# Patient Record
Sex: FEMALE | Race: ASIAN | NOT HISPANIC OR LATINO | URBAN - METROPOLITAN AREA
[De-identification: names, ages, dates, MRNs, and addresses within clinical notes are randomized per-mention and may not be internally consistent; named-entity substitution may affect disease eponyms.]

---

## 2018-01-10 ENCOUNTER — OFFICE VISIT - HEALTHEAST (OUTPATIENT)
Dept: FAMILY MEDICINE | Facility: CLINIC | Age: 28
End: 2018-01-10

## 2018-01-10 DIAGNOSIS — R17 ELEVATED BILIRUBIN: ICD-10-CM

## 2018-01-10 DIAGNOSIS — L29.9 PRURITIC CONDITION: ICD-10-CM

## 2018-01-10 DIAGNOSIS — L30.9 DERMATITIS: ICD-10-CM

## 2018-01-10 LAB
ALBUMIN SERPL-MCNC: 3.8 G/DL (ref 3.5–5)
ALP SERPL-CCNC: 68 U/L (ref 45–120)
ALT SERPL W P-5'-P-CCNC: 14 U/L (ref 0–45)
ANION GAP SERPL CALCULATED.3IONS-SCNC: 11 MMOL/L (ref 5–18)
AST SERPL W P-5'-P-CCNC: 15 U/L (ref 0–40)
BILIRUB SERPL-MCNC: 1.5 MG/DL (ref 0–1)
BUN SERPL-MCNC: 19 MG/DL (ref 8–22)
CALCIUM SERPL-MCNC: 9.4 MG/DL (ref 8.5–10.5)
CHLORIDE BLD-SCNC: 105 MMOL/L (ref 98–107)
CO2 SERPL-SCNC: 25 MMOL/L (ref 22–31)
CREAT SERPL-MCNC: 0.69 MG/DL (ref 0.6–1.1)
ERYTHROCYTE [DISTWIDTH] IN BLOOD BY AUTOMATED COUNT: 12.8 % (ref 11–14.5)
GFR SERPL CREATININE-BSD FRML MDRD: >60 ML/MIN/1.73M2
GLUCOSE BLD-MCNC: 68 MG/DL (ref 70–125)
HCT VFR BLD AUTO: 40.7 % (ref 35–47)
HGB BLD-MCNC: 13.6 G/DL (ref 12–16)
MCH RBC QN AUTO: 28 PG (ref 27–34)
MCHC RBC AUTO-ENTMCNC: 33.4 G/DL (ref 32–36)
MCV RBC AUTO: 84 FL (ref 80–100)
PLATELET # BLD AUTO: 252 THOU/UL (ref 140–440)
PMV BLD AUTO: 6.9 FL (ref 7–10)
POTASSIUM BLD-SCNC: 4.2 MMOL/L (ref 3.5–5)
PROT SERPL-MCNC: 7.2 G/DL (ref 6–8)
RBC # BLD AUTO: 4.85 MILL/UL (ref 3.8–5.4)
SODIUM SERPL-SCNC: 141 MMOL/L (ref 136–145)
TSH SERPL DL<=0.005 MIU/L-ACNC: 1.02 UIU/ML (ref 0.3–5)
WBC: 7.5 THOU/UL (ref 4–11)

## 2018-01-10 RX ORDER — TRIAMCINOLONE ACETONIDE 1 MG/G
CREAM TOPICAL
Qty: 30 G | Refills: 0 | Status: SHIPPED | OUTPATIENT
Start: 2018-01-10

## 2018-01-10 RX ORDER — HYDROXYZINE HYDROCHLORIDE 25 MG/1
25 TABLET, FILM COATED ORAL 3 TIMES DAILY PRN
Qty: 60 TABLET | Refills: 0 | Status: SHIPPED | OUTPATIENT
Start: 2018-01-10

## 2018-01-10 ASSESSMENT — MIFFLIN-ST. JEOR: SCORE: 1217.91

## 2018-01-11 LAB
BILIRUB DIRECT SERPL-MCNC: 0.4 MG/DL
HAPTOGLOB SERPL-MCNC: 155 MG/DL (ref 33–171)
RETICS # AUTO: 0.07 MILL/UL (ref 0.01–0.11)

## 2018-01-12 LAB
BASOPHILS # BLD AUTO: 0 THOU/UL (ref 0–0.2)
BASOPHILS NFR BLD AUTO: 1 % (ref 0–2)
EOSINOPHIL # BLD AUTO: 0.2 THOU/UL (ref 0–0.4)
EOSINOPHIL NFR BLD AUTO: 3 % (ref 0–6)
ERYTHROCYTE [DISTWIDTH] IN BLOOD BY AUTOMATED COUNT: 14.2 % (ref 11–14.5)
HCT VFR BLD AUTO: 41.4 % (ref 35–47)
HGB BLD-MCNC: 13.7 G/DL (ref 12–16)
LAB AP CHARGES (HE HISTORICAL CONVERSION): NORMAL
LYMPHOCYTES # BLD AUTO: 1.8 THOU/UL (ref 0.8–4.4)
LYMPHOCYTES NFR BLD AUTO: 22 % (ref 20–40)
MCH RBC QN AUTO: 27.8 PG (ref 27–34)
MCHC RBC AUTO-ENTMCNC: 33.1 G/DL (ref 32–36)
MCV RBC AUTO: 84 FL (ref 80–100)
MONOCYTES # BLD AUTO: 0.7 THOU/UL (ref 0–0.9)
MONOCYTES NFR BLD AUTO: 9 % (ref 2–10)
NEUTROPHILS # BLD AUTO: 5.4 THOU/UL (ref 2–7.7)
NEUTROPHILS NFR BLD AUTO: 66 % (ref 50–70)
OVALOCYTES: ABNORMAL
PATH REPORT.COMMENTS IMP SPEC: NORMAL
PATH REPORT.COMMENTS IMP SPEC: NORMAL
PATH REPORT.FINAL DX SPEC: NORMAL
PATH REPORT.MICROSCOPIC SPEC OTHER STN: ABNORMAL
PATH REPORT.MICROSCOPIC SPEC OTHER STN: NORMAL
PATH REPORT.RELEVANT HX SPEC: NORMAL
PLAT MORPH BLD: NORMAL
PLATELET # BLD AUTO: 292 THOU/UL (ref 140–440)
PMV BLD AUTO: 10.4 FL (ref 8.5–12.5)
RBC # BLD AUTO: 4.92 MILL/UL (ref 3.8–5.4)
WBC: 8.2 THOU/UL (ref 4–11)

## 2018-01-16 ENCOUNTER — COMMUNICATION - HEALTHEAST (OUTPATIENT)
Dept: FAMILY MEDICINE | Facility: CLINIC | Age: 28
End: 2018-01-16

## 2018-01-18 ENCOUNTER — RECORDS - HEALTHEAST (OUTPATIENT)
Dept: ADMINISTRATIVE | Facility: OTHER | Age: 28
End: 2018-01-18

## 2018-01-19 ENCOUNTER — RECORDS - HEALTHEAST (OUTPATIENT)
Dept: ADMINISTRATIVE | Facility: OTHER | Age: 28
End: 2018-01-19

## 2018-01-22 ENCOUNTER — RECORDS - HEALTHEAST (OUTPATIENT)
Dept: ADMINISTRATIVE | Facility: OTHER | Age: 28
End: 2018-01-22

## 2018-01-25 ENCOUNTER — HOSPITAL ENCOUNTER (OUTPATIENT)
Dept: RADIOLOGY | Facility: HOSPITAL | Age: 28
Discharge: HOME | End: 2018-01-25
Attending: OBSTETRICS & GYNECOLOGY | Admitting: RADIOLOGY

## 2018-01-25 ENCOUNTER — RECORDS - HEALTHEAST (OUTPATIENT)
Dept: ADMINISTRATIVE | Facility: OTHER | Age: 28
End: 2018-01-25

## 2018-01-25 DIAGNOSIS — Z87.59 HISTORY OF ECTOPIC PREGNANCY: ICD-10-CM

## 2018-02-01 ENCOUNTER — COMMUNICATION - HEALTHEAST (OUTPATIENT)
Dept: FAMILY MEDICINE | Facility: CLINIC | Age: 28
End: 2018-02-01

## 2018-02-06 ENCOUNTER — AMBULATORY - HEALTHEAST (OUTPATIENT)
Dept: FAMILY MEDICINE | Facility: CLINIC | Age: 28
End: 2018-02-06

## 2018-02-06 ENCOUNTER — COMMUNICATION - HEALTHEAST (OUTPATIENT)
Dept: TELEHEALTH | Facility: CLINIC | Age: 28
End: 2018-02-06

## 2018-02-06 ENCOUNTER — AMBULATORY - HEALTHEAST (OUTPATIENT)
Dept: LAB | Facility: CLINIC | Age: 28
End: 2018-02-06

## 2018-02-06 DIAGNOSIS — R17 SERUM TOTAL BILIRUBIN ELEVATED: ICD-10-CM

## 2018-02-06 LAB
ALBUMIN SERPL-MCNC: 3.9 G/DL (ref 3.5–5)
ALP SERPL-CCNC: 63 U/L (ref 45–120)
ALT SERPL W P-5'-P-CCNC: <9 U/L (ref 0–45)
ANION GAP SERPL CALCULATED.3IONS-SCNC: 9 MMOL/L (ref 5–18)
AST SERPL W P-5'-P-CCNC: 15 U/L (ref 0–40)
BASOPHILS # BLD AUTO: 0 THOU/UL (ref 0–0.2)
BASOPHILS NFR BLD AUTO: 1 % (ref 0–2)
BILIRUB DIRECT SERPL-MCNC: 0.3 MG/DL
BILIRUB SERPL-MCNC: 1.1 MG/DL (ref 0–1)
BUN SERPL-MCNC: 14 MG/DL (ref 8–22)
CALCIUM SERPL-MCNC: 9.2 MG/DL (ref 8.5–10.5)
CHLORIDE BLD-SCNC: 106 MMOL/L (ref 98–107)
CO2 SERPL-SCNC: 24 MMOL/L (ref 22–31)
CREAT SERPL-MCNC: 0.66 MG/DL (ref 0.6–1.1)
EOSINOPHIL # BLD AUTO: 0.2 THOU/UL (ref 0–0.4)
EOSINOPHIL NFR BLD AUTO: 4 % (ref 0–6)
ERYTHROCYTE [DISTWIDTH] IN BLOOD BY AUTOMATED COUNT: 13 % (ref 11–14.5)
GFR SERPL CREATININE-BSD FRML MDRD: >60 ML/MIN/1.73M2
GLUCOSE BLD-MCNC: 82 MG/DL (ref 70–125)
HCT VFR BLD AUTO: 40.2 % (ref 35–47)
HGB BLD-MCNC: 13.2 G/DL (ref 12–16)
INR PPP: 1.13 (ref 0.9–1.1)
LYMPHOCYTES # BLD AUTO: 1.6 THOU/UL (ref 0.8–4.4)
LYMPHOCYTES NFR BLD AUTO: 26 % (ref 20–40)
MCH RBC QN AUTO: 27.5 PG (ref 27–34)
MCHC RBC AUTO-ENTMCNC: 32.8 G/DL (ref 32–36)
MCV RBC AUTO: 84 FL (ref 80–100)
MONOCYTES # BLD AUTO: 0.4 THOU/UL (ref 0–0.9)
MONOCYTES NFR BLD AUTO: 7 % (ref 2–10)
NEUTROPHILS # BLD AUTO: 3.8 THOU/UL (ref 2–7.7)
NEUTROPHILS NFR BLD AUTO: 63 % (ref 50–70)
PLATELET # BLD AUTO: 236 THOU/UL (ref 140–440)
PMV BLD AUTO: 7.3 FL (ref 7–10)
POTASSIUM BLD-SCNC: 3.7 MMOL/L (ref 3.5–5)
PROT SERPL-MCNC: 7.4 G/DL (ref 6–8)
RBC # BLD AUTO: 4.79 MILL/UL (ref 3.8–5.4)
SODIUM SERPL-SCNC: 139 MMOL/L (ref 136–145)
WBC: 6.1 THOU/UL (ref 4–11)

## 2018-02-07 ENCOUNTER — COMMUNICATION - HEALTHEAST (OUTPATIENT)
Dept: FAMILY MEDICINE | Facility: CLINIC | Age: 28
End: 2018-02-07

## 2018-02-07 ENCOUNTER — OFFICE VISIT - HEALTHEAST (OUTPATIENT)
Dept: FAMILY MEDICINE | Facility: CLINIC | Age: 28
End: 2018-02-07

## 2018-02-07 DIAGNOSIS — L29.9 PRURITIC CONDITION: ICD-10-CM

## 2018-02-07 DIAGNOSIS — R17 ELEVATED BILIRUBIN: ICD-10-CM

## 2018-02-07 RX ORDER — PERMETHRIN 50 MG/G
CREAM TOPICAL
Qty: 60 G | Refills: 1 | Status: SHIPPED | OUTPATIENT
Start: 2018-02-07

## 2018-02-08 ENCOUNTER — COMMUNICATION - HEALTHEAST (OUTPATIENT)
Dept: FAMILY MEDICINE | Facility: CLINIC | Age: 28
End: 2018-02-08

## 2018-02-08 LAB
ERYTHROCYTE [SEDIMENTATION RATE] IN BLOOD BY WESTERGREN METHOD: 7 MM/HR (ref 0–20)
HBV SURFACE AG SERPL QL IA: NEGATIVE
HIV 1+2 AB+HIV1 P24 AG SERPL QL IA: NEGATIVE

## 2018-02-09 ENCOUNTER — COMMUNICATION - HEALTHEAST (OUTPATIENT)
Dept: FAMILY MEDICINE | Facility: CLINIC | Age: 28
End: 2018-02-09

## 2018-02-09 LAB
BILE AC SERPL-SCNC: 3 UMOL/L (ref 0–10)
HBV SURFACE AB SERPL IA-ACNC: POSITIVE M[IU]/ML
HCV AB SERPL QL IA: NEGATIVE

## 2021-05-31 ENCOUNTER — RECORDS - HEALTHEAST (OUTPATIENT)
Dept: ADMINISTRATIVE | Facility: OTHER | Age: 31
End: 2021-05-31

## 2021-05-31 VITALS — WEIGHT: 117 LBS | BODY MASS INDEX: 21.23 KG/M2

## 2021-05-31 VITALS — HEIGHT: 62 IN | WEIGHT: 119.2 LBS | BODY MASS INDEX: 21.94 KG/M2

## 2021-06-15 NOTE — PROGRESS NOTES
[facility] clinic EXAM note      Chief Complaint   Patient presents with     Allergic Reaction     started bumps on groin, spread all over body- treated at ER for over 1 months 10 days- went [facility]       Assessment & Plan    Problem List Items Addressed This Visit     None      Visit Diagnoses     Dermatitis    -  Primary: Refill of her triamcinolone and Atarax.  To help with symptomatic support we work pruritus up further.    Relevant Medications    triamcinolone (KENALOG) 0.1 % cream    hydrOXYzine HCl (ATARAX) 25 MG tablet    Pruritic condition    : It does seem to be more of an itch that rashes.  Especially given that there is no lesions on her back.  Discussed with her will workup further with some labs today to see if there is a systemic cause of her itching which I am suspecting at this time as her symptoms continued.  In the meantime continue with triamcinolone and Atarax for symptoms.    Relevant Medications    triamcinolone (KENALOG) 0.1 % cream    Other Relevant Orders    Comprehensive Metabolic Panel (Completed)    HM2(CBC w/o Differential) (Completed)    Thyroid Stimulating Hormone (TSH) (Completed)    HM1(CBC and Differential)    Elevated bilirubin    : Update: Initial lab work returned with isolated elevated bilirubin level 1.5.  Added on labs per below.  Unfortunately I also ordered a CBC without a differential and I wanted a differential so I added this on as well.    Relevant Orders    Bilirubin, Direct    Haptoglobin    Reticulocytes          History    Carey Hamilton is a 27 y.o.  female who presents for the following issues:    Pruritus: Patient returned from 6-7 months in [facility] beginning of December.  About a week after her return she developed itching all over her body.  Started in her thighs and then seemed to spread from there.  Bumps on her legs, abdomen, arms.  Not really having any on her back.  Very very itchy.  Causing red bumps all over her body.  She thinks most of it is just a  general itchiness and then she scratches and get the bumps however she has some larger bumps in her thighs right now that she says she thinks the bumps started first and then had the itching.  She was seen in the ED at [facility] on 12/31.  She was diagnosed with a dermatitis.  They did not think it looked like scabies or bug bites.  They prescribed her course of prednisone, Atarax and hydrocortisone cream.  She states that the medications have been helping and overall the rash does seem much improved from previous.  However it does continue and that is why she wanted to come and be seen today.  She tried all the other recommendations as well including changing her laundry soap, washing her clothes.  She has been watching what she eats but has been eating the same foods.  She stopped drinking a new juice that she had.  And everything has continued.  She is living with her family currently and no one else in the household is itching.  Itching is worse at night to the point she scratches so hard that she bleeds.  Is wondering she is allergic to something.  She did not have any issues while she was in [facility].  The symptoms did not start until 1 week after she came here.  Wondering if she got something on the airplane.  No respiratory distress,  throat swelling perioral tingling.    2.  Establish care: Patient without any significant past medical history.  She did have surgery for ectopic pregnancy April 2017 and [place].  She states that she went to [facility] to get  her  is still over there and planning to move to the .  She is currently living with her family here in [place].      DATA REVIEWED:      mEDICATIONS    No current outpatient prescriptions on file prior to visit.     No current facility-administered medications on file prior to visit.        Pertinent past medical, surgical, social and family history reviewed and updated in [employer].    Social History     Social History     Marital status:  "Single     Spouse name: N/A     Number of children: N/A     Years of education: N/A     Occupational History     Not on file.     Social History Main Topics     Smoking status: Never Smoker     Smokeless tobacco: Never Used     Alcohol use Not on file     Drug use: Not on file     Sexual activity: Not on file     Other Topics Concern     Not on file     Social History Narrative     No narrative on file         Review of systems     Pertinent Positives and negatives in HPI.     Physical Exam    BP 92/58 (Patient Site: Left Arm, Patient Position: Sitting, Cuff Size: Adult Regular)  Pulse 72  Ht 5' 2.25\" (1.581 m)  Wt 119 lb 3.2 oz (54.1 kg)  LMP 12/16/2017  Breastfeeding? No  BMI 21.63 kg/m2  GEN:  27 y.o. female sitting comfortably in no apparent distress.   HEENT: EOMI, no scleral icterus, buccal mucosa moist TMs clear with good cone light reflex.  Neck: Supple without lymphadenopathy or thyromegally   CHEST/LUNG: No respiratory distress, good air flow to bases, CTAB   CV: RRR, S1, S2 normal; no murmurs, rubs or gallops.   ABD:  Soft, non-tender, non distended, no guarding,  No masses.  Her liver does feel slightly enlarged.  Nontender.  MSK:  Strength grossly normal  SKIN: Diffuse scattered red papules on her arms bilaterally, abdomen, legs.  In her upper inner thighs she also has some larger slightly raised purplish colored lesions.  Also with intermittent healing or healed excoriations.  No papules on her back.  No jaundice.  NEURO: Gait normal, coordination intact  PSYCH:  Mood and affect appropriate      Follow up: Pending lab results.    Briana Johnson    "

## 2021-06-15 NOTE — PROGRESS NOTES
[facility] Clinic Office Visit    Chief Complaint:  Chief Complaint   Patient presents with     Follow-up     lab result from 2/6/18, itchiness worsen         Assessment/Plan:  1. Elevated bilirubin  Unclear etiology and bili seems to be going down- may be viral or incidental.  Recheck bili and final evaluation as below.  Anticipate normal findings.  Itching does not seem to be GI related since started with rash first.    - Bile Acids, Total  - HIV Antigen/Antibody Screening Cascade  - Hepatitis B Surface Antibody (Anti-HBs) Vaccine Check  - Hepatitis C Antibody (Anti-HCV)  - Hepatitis B Surface Antigen (HBsAG)  - permethrin (ELIMITE) 5 % cream; Massage in skin from neck down and wash off after 14 hours.  Repeat in 1 week.  Dispense: 60 g; Refill: 1  - Erythrocyte Sedimentation Rate    2. Pruritic condition  Labs as below to finish GI eval for cause of itchign.  Wonder about possible scabies- tx with permethrin as below.  Derm consultation reassuring.  Allergy consultation encouraged as scheduled.    - Bile Acids, Total  - HIV Antigen/Antibody Screening Cascade  - Hepatitis B Surface Antibody (Anti-HBs) Vaccine Check  - Hepatitis C Antibody (Anti-HCV)  - Hepatitis B Surface Antigen (HBsAG)  - permethrin (ELIMITE) 5 % cream; Massage in skin from neck down and wash off after 14 hours.  Repeat in 1 week.  Dispense: 60 g; Refill: 1  - Erythrocyte Sedimentation Rate    Return if symptoms worsen or fail to improve.    Patient Education/AVS:  Patient Instructions   Try the cream to treat scabies?  Apply from neck down and wash off after 14 hours - repeat in 1 week.        HPI:   Carey Hamilton is a 28 y.o. female c/o ongoing itching and concern about liver issues as cause.     Ongoing itching- did see dermatology and told it is possible bed bugs.  Bed partners do not have same spots.  Some days are normal some days are worse.  Itching worse at night.  Wondering about seeing an allergist- has apt later this week.   Was suppose to see Dr Johnson back today but she is at a delivery and had to reschedule.  Dermatology did a skin biopsy and scratch testing.  Cream they gave her isn't helping.  Most itchy on inner thighs, waist line, wrists, elbows, butt and lower legs where she gets red bumps.  Will get welts at times.  Itchiness is more at night.      ROS:  Constitutional, CV, Resp, GI, , MSK, skin, neuro, psych all negative except as outlined in the HPI above.    History summarized from1-2:1/10/18 seen by Dr Johnson for itchy rash that developed after a trip to [facility].  Dx with dermatitis and pruritis and elevated bili levels.  tx with kenalog and hydroxyzine.  Lab tests as below.    Radiology tests reviewed-1: hysterosalpingogram 1/25/18  Lab tests reviewed-1: bili 1.5, normal eosinophils.  Ovalocytes. Bili down to 1.1 yesterday.  INR 1.13.       Physical Exam:  BP 90/66 (Patient Site: Right Arm, Patient Position: Sitting, Cuff Size: Adult Regular)  Pulse 88  Temp 97.6  F (36.4  C) (Oral)   Wt 117 lb (53.1 kg)  LMP 01/25/2018  Breastfeeding? No  BMI 21.23 kg/m2 Body mass index is 21.23 kg/(m^2). Patient's last menstrual period was 01/25/2018.  Vital signs reviewed  Wt Readings from Last 3 Encounters:   02/07/18 117 lb (53.1 kg)   01/10/18 119 lb 3.2 oz (54.1 kg)     History   Smoking Status     Never Smoker   Smokeless Tobacco     Never Used     History   Sexual Activity     Sexual activity: Not on file     No Data Recorded  No Data Recorded  PHQ-2 Total Score: 0 (2/7/2018  2:32 PM)  No Data Recorded    All normal as below except abnormalities include: evidence for excoriations/scratch marks on ventral wrists, inner thighs/groin, waist line.  No obvious rash or red lesions.    General is a  28 y.o. female sitting comfortably in no apparent distress.   HEENT:  TM are clear bilaterally.  Eye, nasal, oral exams within normal   Neck: Supple without lymphadenopathy or thyromegally  CV: Regular rate and rhythm S1S2 without  rubs, murmurs or gallops,   Lungs: Clear to auscultation bilaterally  Abd:  +BS, soft NT/ND,  No masses or organomegally  Extremities: Warm, No Edema, 2+ Pedal and radial pulses bilaterally  Neuro/MSK: Able to ambulate around the exam room with equal movement, strength and normal coordination of the upper and lower extremeties symmetrically    Results for orders placed or performed in visit on 02/07/18   Bile Acids, Total   Result Value Ref Range    Bile Acids, Total 3 0 - 10 umol/L   HIV Antigen/Antibody Screening Cascade   Result Value Ref Range    HIV Antigen / Antibody Negative Negative   Hepatitis B Surface Antibody (Anti-HBs) Vaccine Check   Result Value Ref Range    HBV Surface Ab (Vaccine Check) Positive Positive   Hepatitis C Antibody (Anti-HCV)   Result Value Ref Range    Hepatitis C Ab Negative Negative   Hepatitis B Surface Antigen (HBsAG)   Result Value Ref Range    Hepatitis B Surface Ag Negative Negative   Erythrocyte Sedimentation Rate   Result Value Ref Range    Sed Rate 7 0 - 20 mm/hr       Med list and active problem list reviewed and updated as part of this encounter    Current Outpatient Prescriptions on File Prior to Visit   Medication Sig Dispense Refill     hydrOXYzine HCl (ATARAX) 25 MG tablet Take 1 tablet (25 mg total) by mouth 3 (three) times a day as needed for itching. 60 tablet 0     triamcinolone (KENALOG) 0.1 % cream Apply to affected areas twice a day for 7 days. 30 g 0     No current facility-administered medications on file prior to visit.          Veronika Amato MD

## 2021-06-15 NOTE — PROGRESS NOTES
Please call patient and inform:  Bilirubin level came down to 1.1.  This is only 0.1 elevated from normal.  Still seems to be unclear diagnosis at this time.  We could consider testing for that Gilbert's disease that I talked about or just continuing to monitor.  Sorry that I had to cancel our appointment yesterday.  We will plan to see you next week for follow-up as scheduled.

## 2021-06-16 PROBLEM — L30.9 DERMATITIS: Status: ACTIVE | Noted: 2018-01-11

## 2021-06-16 PROBLEM — L29.9 PRURITIC CONDITION: Status: ACTIVE | Noted: 2018-01-11

## 2021-06-16 PROBLEM — R17 ELEVATED BILIRUBIN: Status: ACTIVE | Noted: 2018-01-11

## 2021-07-03 NOTE — ADDENDUM NOTE
Addendum Note by Briana Johnson DO at 1/11/2018 12:01 PM     Author: Briana Johnson DO Service: -- Author Type: Physician    Filed: 1/11/2018 12:01 PM Encounter Date: 1/10/2018 Status: Signed    : Briana Johnson DO (Physician)    Addended by: BRIANA JOHNSON on: 1/11/2018 12:01 PM        Modules accepted: Orders

## 2021-07-06 VITALS — BODY MASS INDEX: 25.75 KG/M2 | WEIGHT: 150 LBS | HEIGHT: 64 IN
